# Patient Record
Sex: FEMALE | Race: WHITE
[De-identification: names, ages, dates, MRNs, and addresses within clinical notes are randomized per-mention and may not be internally consistent; named-entity substitution may affect disease eponyms.]

---

## 2021-04-04 ENCOUNTER — HOSPITAL ENCOUNTER (EMERGENCY)
Dept: HOSPITAL 41 - JD.ED | Age: 41
LOS: 1 days | Discharge: HOME | End: 2021-04-05
Payer: COMMERCIAL

## 2021-04-04 DIAGNOSIS — Z20.822: ICD-10-CM

## 2021-04-04 DIAGNOSIS — E05.90: ICD-10-CM

## 2021-04-04 DIAGNOSIS — R00.0: Primary | ICD-10-CM

## 2021-04-04 DIAGNOSIS — I44.0: ICD-10-CM

## 2021-04-04 DIAGNOSIS — R03.0: ICD-10-CM

## 2021-04-04 DIAGNOSIS — E87.6: ICD-10-CM

## 2021-04-04 PROCEDURE — U0002 COVID-19 LAB TEST NON-CDC: HCPCS

## 2021-04-05 NOTE — EDM.PDOC
ED HPI GENERAL MEDICAL PROBLEM





- General


Chief Complaint: Cardiovascular Problem


Stated Complaint: HEART PALP AND ARM NUMBNESS


Time Seen by Provider: 04/04/21 19:35


Source of Information: Reports: Patient


History Limitations: Reports: No Limitations





- History of Present Illness


INITIAL COMMENTS - FREE TEXT/NARRATIVE: 





The patient has come in with palpitations and complaint of mild tachycardia.  

She has taken her wrist pulse and found it to be around 100.  No chest pain or 

shortness of breath.  No other troubling symptoms.  No fever chest pain nilton 

shortness of breath cough urinary symptoms nausea vomiting.  There is a history 

of hyperthyroidism.  Apparently there is been some resolution of this without 

definitive treatment.  Patient is a non-smoker.  She is on a potassium 

supplement and an SSRI.  Lives in [place] and has an endocrinologist managing

her there.





- Related Data


                                    Allergies











Allergy/AdvReac Type Severity Reaction Status Date / Time


 


No Known Allergies Allergy   Verified 04/04/21 19:57











Home Meds: 


                                    Home Meds





Metoprolol Tartrate [Lopressor] 25 mg PO Q12HR #30 tab 04/05/21 [Rx]











Past Medical History


Endocrine/Metabolic History: Reports: Hyperthyroidism, Other (See Below)


Other Endocrine/Metabolic History: hypokalemia





Social & Family History





- Tobacco Use


Tobacco Use Status *Q: Never Tobacco User





ED ROS GENERAL





- Review of Systems


Review Of Systems: Comprehensive ROS is negative, except as noted in HPI.





ED EXAM, GENERAL





- Physical Exam


Exam: See Below


Free Text/Narrative:: 





On exam the patient is alert and in no distress.  Skin is warm and dry with 

normal turgor.  Head normocephalic atraumatic.  PERRLA EOMI.  ENT grossly 

normal.  Neck is supple without jugular venous distention.  Lungs are clear 

breath sounds are full and equal bilaterally.  Heart is a bit tachycardic rate 

around 100 no murmur.  Abdomen is soft and nontender no flank tenderness no 

guarding or rebound.  No peripheral edema cyanosis or clubbing of the digits.  

Neurologically she is intact with fluent speech symmetrical gait and no sensory 

or motor deficit.  She is poised with appropriate mood and affect.


  ** #1 Interpretation


EKG Date: 04/04/21


Time: 20:10


Rhythm: NSR


Rate (Beats/Min): 98


Axis: Normal


P-Wave: Present


QRS: Normal


ST-T: Normal


QT: Normal


MT/PQ Interval: 226ms


Comparison: NA - No Prior EKG


EKG Interpretation Comments: 





The patient has a mild tachycardia.  There is a first-degree AV block.  There is

 no acute ischemic change.





Course





- Vital Signs


Text/Narrative:: 





The patient is noted to be hypokalemic.  She knows that she is chronically 

hypokalemic and does take a potassium supplement.  She has been replenished both

 IV and orally here.  She is to continue taking her potassium supplement.  She 

is remained a little hypertensive and with a heart rate around 100.  We will go 

ahead and start her on a modest dose of beta-blocker 25 mg p.o. of Lopressor 

administered in the ER.  She will be continued on 25 mg twice a day.  She is 

going back to [place] her within 48 hours and is to notify her 

endocrinologist and other appropriate specialist to see her as soon as possible 

upon return.  Strict precautions for return to ER.


Last Recorded V/S: 





                                Last Vital Signs











Temp  37.0 C   04/04/21 19:54


 


Pulse  100   04/04/21 19:54


 


Resp  18   04/04/21 19:54


 


BP  169/100 H  04/04/21 19:54


 


Pulse Ox  98   04/04/21 19:54














- Orders/Labs/Meds


Orders: 





                               Active Orders 24 hr











 Category Date Time Status


 


 EKG Documentation Completion [RC] STAT Care  04/04/21 20:00 Active


 


 Peripheral IV Care [RC] .AS DIRECTED Care  04/04/21 20:02 Active


 


 Chest 1V Frontal [CR] Stat Exams  04/05/21 01:16 Taken


 


 CULTURE URINE [RM] Stat Lab  04/04/21 23:45 Received


 


 Metoprolol Tartrate [Lopressor] Med  04/05/21 02:30 Ordered





 25 mg PO ONETIME   


 


 Sodium Chloride 0.9% [Normal Saline] 1,000 ml Med  04/04/21 20:15 Active





 IV ASDIRECTED   


 


 Sodium Chloride 0.9% [Saline Flush] Med  04/04/21 20:00 Active





 10 ml FLUSH ASDIRECTED PRN   


 


 Peripheral IV Insertion Adult [OM.PC] Stat Oth  04/04/21 20:01 Ordered








                                Medication Orders





Sodium Chloride (Normal Saline)  1,000 mls @ 100 mls/hr IV ASDIRECTED MAREN


   Last Admin: 04/04/21 20:27  Dose: 100 mls/hr


   Documented by: DARIN


Sodium Chloride (Sodium Chloride 0.9% 10 Ml Syringe)  10 ml FLUSH ASDIRECTED PRN


   PRN Reason: Keep Vein Open


   Last Admin: 04/04/21 20:28  Dose: 10 ml


   Documented by: DARIN








Labs: 





                                Laboratory Tests











  04/04/21 04/04/21 04/04/21 Range/Units





  20:25 20:25 20:25 


 


WBC  5.24    (3.98-10.04)  K/mm3


 


RBC  4.94    (3.98-5.22)  M/mm3


 


Hgb  14.0    (11.2-15.7)  gm/dl


 


Hct  41.4    (34.1-44.9)  %


 


MCV  83.8    (79.4-94.8)  fl


 


MCH  28.3    (25.6-32.2)  pg


 


MCHC  33.8    (32.2-35.5)  g/dl


 


RDW Std Deviation  42.6    (36.4-46.3)  fL


 


Plt Count  311    (182-369)  K/mm3


 


MPV  9.9    (9.4-12.3)  fl


 


Neutrophils % (Manual)  61 H    (40-60)  %


 


Band Neutrophils %  0    (0-10)  %


 


Lymphocytes % (Manual)  27    (20-40)  %


 


Atypical Lymphs %  2    %


 


Monocytes % (Manual)  8    (2-10)  %


 


Eosinophils % (Manual)  1    (0.7-5.8)  %


 


Basophils % (Manual)  1    (0.1-1.2)  


 


Platelet Estimate  Adequate    


 


RBC Morph Comment  Normal    


 


PT    10.3  (9.7-12.0)  SECONDS


 


INR    0.96  


 


APTT    25.4  (21.7-31.4)  SECONDS


 


D-Dimer, Quantitative    < 0.19 L  (0.19-0.50)  mg/L


 


Sodium   145   (136-145)  mEq/L


 


Potassium   2.9 L   (3.5-5.1)  mEq/L


 


Chloride   104   ()  mEq/L


 


Carbon Dioxide   30   (21-32)  mEq/L


 


Anion Gap   13.9   (5-15)  


 


BUN   8   (7-18)  mg/dL


 


Creatinine   0.8   (0.55-1.02)  mg/dL


 


Est Cr Clr Drug Dosing   TNP   


 


Estimated GFR (MDRD)   > 60   (>60)  mL/min


 


BUN/Creatinine Ratio   10.0 L   (14-18)  


 


Glucose   108 H   ()  mg/dL


 


Calcium   9.6   (8.5-10.1)  mg/dL


 


Magnesium   2.2   (1.8-2.4)  mg/dl


 


Total Bilirubin   0.4   (0.2-1.0)  mg/dL


 


AST   21   (15-37)  U/L


 


ALT   31   (14-59)  U/L


 


Alkaline Phosphatase   76   ()  U/L


 


Creatine Kinase   139   ()  U/L


 


Troponin I   < 0.017   (0.00-0.056)  ng/mL


 


C-Reactive Protein   <0.2   (<1.0)  mg/dL


 


Total Protein   7.6   (6.4-8.2)  g/dl


 


Albumin   4.2   (3.4-5.0)  g/dl


 


Globulin   3.4   gm/dL


 


Albumin/Globulin Ratio   1.2   (1-2)  


 


TSH 3rd Generation   5.578 H   (0.358-3.74)  uIU/mL


 


Urine Color     (Yellow)  


 


Urine Appearance     (Clear)  


 


Urine pH     (5.0-8.0)  


 


Ur Specific Gravity     (1.005-1.030)  


 


Urine Protein     (Negative)  


 


Urine Glucose (UA)     (Negative)  


 


Urine Ketones     (Negative)  


 


Urine Occult Blood     (Negative)  


 


Urine Nitrite     (Negative)  


 


Urine Bilirubin     (Negative)  


 


Urine Urobilinogen     (0.2-1.0)  


 


Ur Leukocyte Esterase     (Negative)  


 


Urine RBC     (0-5)  /hpf


 


Urine WBC     (0-5)  /hpf


 


Ur Squamous Epith Cells     (0-5)  /hpf


 


Amorphous Sediment     (NOT SEEN)  /hpf


 


Urine Bacteria     (FEW)  /hpf


 


Urine Mucus     (FEW)  /hpf


 


Urine HCG, Qual     (NEGATIVE)  


 


Urine Opiates Screen     (SLFYSQ=042)  


 


Ur Buprenorphine Scrn     (CUTOFF=10)  


 


Ur Oxycodone Screen     (OHA2EP=029)  


 


Urine Methadone Screen     (SPUFIR=634)  


 


Ur Propoxyphene Screen     (HFXERO=695)  


 


Ur Barbiturates Screen     (FAJNMG=538)  


 


Ur Tricyclics Screen     (OODTNJ=211)  


 


Ur Phencyclidine Scrn     (CUTOFF=25)  


 


Ur Amphetamine Screen     (VDFQDD=959)  


 


U Methamphetamines Scrn     (YIQASN=770)  


 


U Benzodiazepines Scrn     (SBGLSJ=047)  


 


U Cocaine Metab Screen     (RLDMAE=650)  


 


U Marijuana (THC) Screen     (CUTOFF=50)  


 


Ethyl Alcohol   0.00   (0.00)  gm%


 


SARS-CoV-2 RNA (FRANCI)     (NEGATIVE)  














  04/04/21 04/04/21 04/04/21 Range/Units





  23:45 23:45 23:45 


 


WBC     (3.98-10.04)  K/mm3


 


RBC     (3.98-5.22)  M/mm3


 


Hgb     (11.2-15.7)  gm/dl


 


Hct     (34.1-44.9)  %


 


MCV     (79.4-94.8)  fl


 


MCH     (25.6-32.2)  pg


 


MCHC     (32.2-35.5)  g/dl


 


RDW Std Deviation     (36.4-46.3)  fL


 


Plt Count     (182-369)  K/mm3


 


MPV     (9.4-12.3)  fl


 


Neutrophils % (Manual)     (40-60)  %


 


Band Neutrophils %     (0-10)  %


 


Lymphocytes % (Manual)     (20-40)  %


 


Atypical Lymphs %     %


 


Monocytes % (Manual)     (2-10)  %


 


Eosinophils % (Manual)     (0.7-5.8)  %


 


Basophils % (Manual)     (0.1-1.2)  


 


Platelet Estimate     


 


RBC Morph Comment     


 


PT     (9.7-12.0)  SECONDS


 


INR     


 


APTT     (21.7-31.4)  SECONDS


 


D-Dimer, Quantitative     (0.19-0.50)  mg/L


 


Sodium     (136-145)  mEq/L


 


Potassium     (3.5-5.1)  mEq/L


 


Chloride     ()  mEq/L


 


Carbon Dioxide     (21-32)  mEq/L


 


Anion Gap     (5-15)  


 


BUN     (7-18)  mg/dL


 


Creatinine     (0.55-1.02)  mg/dL


 


Est Cr Clr Drug Dosing     


 


Estimated GFR (MDRD)     (>60)  mL/min


 


BUN/Creatinine Ratio     (14-18)  


 


Glucose     ()  mg/dL


 


Calcium     (8.5-10.1)  mg/dL


 


Magnesium     (1.8-2.4)  mg/dl


 


Total Bilirubin     (0.2-1.0)  mg/dL


 


AST     (15-37)  U/L


 


ALT     (14-59)  U/L


 


Alkaline Phosphatase     ()  U/L


 


Creatine Kinase     ()  U/L


 


Troponin I     (0.00-0.056)  ng/mL


 


C-Reactive Protein     (<1.0)  mg/dL


 


Total Protein     (6.4-8.2)  g/dl


 


Albumin     (3.4-5.0)  g/dl


 


Globulin     gm/dL


 


Albumin/Globulin Ratio     (1-2)  


 


TSH 3rd Generation     (0.358-3.74)  uIU/mL


 


Urine Color   Light yellow   (Yellow)  


 


Urine Appearance   Clear   (Clear)  


 


Urine pH   7.5   (5.0-8.0)  


 


Ur Specific Gravity   1.020   (1.005-1.030)  


 


Urine Protein   Negative   (Negative)  


 


Urine Glucose (UA)   Negative   (Negative)  


 


Urine Ketones   1+ H   (Negative)  


 


Urine Occult Blood   Trace-intact H   (Negative)  


 


Urine Nitrite   Negative   (Negative)  


 


Urine Bilirubin   Negative   (Negative)  


 


Urine Urobilinogen   0.2   (0.2-1.0)  


 


Ur Leukocyte Esterase   Trace H   (Negative)  


 


Urine RBC   0-5   (0-5)  /hpf


 


Urine WBC   0-5   (0-5)  /hpf


 


Ur Squamous Epith Cells   0-5   (0-5)  /hpf


 


Amorphous Sediment   Rare H   (NOT SEEN)  /hpf


 


Urine Bacteria   Rare   (FEW)  /hpf


 


Urine Mucus   Rare   (FEW)  /hpf


 


Urine HCG, Qual  Negative    (NEGATIVE)  


 


Urine Opiates Screen    Negative  (LPUFQF=619)  


 


Ur Buprenorphine Scrn    Negative  (CUTOFF=10)  


 


Ur Oxycodone Screen    Negative  (ZKU3XC=673)  


 


Urine Methadone Screen    Negative  (FGRSLZ=214)  


 


Ur Propoxyphene Screen    Negative  (JLYBEE=543)  


 


Ur Barbiturates Screen    Negative  (AOKFVS=020)  


 


Ur Tricyclics Screen    Negative  (QCPHBC=098)  


 


Ur Phencyclidine Scrn    Negative  (CUTOFF=25)  


 


Ur Amphetamine Screen    Negative  (IIPHNN=051)  


 


U Methamphetamines Scrn    Negative  (FBXVVV=218)  


 


U Benzodiazepines Scrn    Negative  (DWCZIB=527)  


 


U Cocaine Metab Screen    Negative  (KSBSNY=965)  


 


U Marijuana (THC) Screen    Negative  (CUTOFF=50)  


 


Ethyl Alcohol     (0.00)  gm%


 


SARS-CoV-2 RNA (FRANCI)     (NEGATIVE)  














  04/04/21 Range/Units





  23:48 


 


WBC   (3.98-10.04)  K/mm3


 


RBC   (3.98-5.22)  M/mm3


 


Hgb   (11.2-15.7)  gm/dl


 


Hct   (34.1-44.9)  %


 


MCV   (79.4-94.8)  fl


 


MCH   (25.6-32.2)  pg


 


MCHC   (32.2-35.5)  g/dl


 


RDW Std Deviation   (36.4-46.3)  fL


 


Plt Count   (182-369)  K/mm3


 


MPV   (9.4-12.3)  fl


 


Neutrophils % (Manual)   (40-60)  %


 


Band Neutrophils %   (0-10)  %


 


Lymphocytes % (Manual)   (20-40)  %


 


Atypical Lymphs %   %


 


Monocytes % (Manual)   (2-10)  %


 


Eosinophils % (Manual)   (0.7-5.8)  %


 


Basophils % (Manual)   (0.1-1.2)  


 


Platelet Estimate   


 


RBC Morph Comment   


 


PT   (9.7-12.0)  SECONDS


 


INR   


 


APTT   (21.7-31.4)  SECONDS


 


D-Dimer, Quantitative   (0.19-0.50)  mg/L


 


Sodium   (136-145)  mEq/L


 


Potassium   (3.5-5.1)  mEq/L


 


Chloride   ()  mEq/L


 


Carbon Dioxide   (21-32)  mEq/L


 


Anion Gap   (5-15)  


 


BUN   (7-18)  mg/dL


 


Creatinine   (0.55-1.02)  mg/dL


 


Est Cr Clr Drug Dosing   


 


Estimated GFR (MDRD)   (>60)  mL/min


 


BUN/Creatinine Ratio   (14-18)  


 


Glucose   ()  mg/dL


 


Calcium   (8.5-10.1)  mg/dL


 


Magnesium   (1.8-2.4)  mg/dl


 


Total Bilirubin   (0.2-1.0)  mg/dL


 


AST   (15-37)  U/L


 


ALT   (14-59)  U/L


 


Alkaline Phosphatase   ()  U/L


 


Creatine Kinase   ()  U/L


 


Troponin I   (0.00-0.056)  ng/mL


 


C-Reactive Protein   (<1.0)  mg/dL


 


Total Protein   (6.4-8.2)  g/dl


 


Albumin   (3.4-5.0)  g/dl


 


Globulin   gm/dL


 


Albumin/Globulin Ratio   (1-2)  


 


TSH 3rd Generation   (0.358-3.74)  uIU/mL


 


Urine Color   (Yellow)  


 


Urine Appearance   (Clear)  


 


Urine pH   (5.0-8.0)  


 


Ur Specific Gravity   (1.005-1.030)  


 


Urine Protein   (Negative)  


 


Urine Glucose (UA)   (Negative)  


 


Urine Ketones   (Negative)  


 


Urine Occult Blood   (Negative)  


 


Urine Nitrite   (Negative)  


 


Urine Bilirubin   (Negative)  


 


Urine Urobilinogen   (0.2-1.0)  


 


Ur Leukocyte Esterase   (Negative)  


 


Urine RBC   (0-5)  /hpf


 


Urine WBC   (0-5)  /hpf


 


Ur Squamous Epith Cells   (0-5)  /hpf


 


Amorphous Sediment   (NOT SEEN)  /hpf


 


Urine Bacteria   (FEW)  /hpf


 


Urine Mucus   (FEW)  /hpf


 


Urine HCG, Qual   (NEGATIVE)  


 


Urine Opiates Screen   (DIHNNZ=864)  


 


Ur Buprenorphine Scrn   (CUTOFF=10)  


 


Ur Oxycodone Screen   (UIB2OS=890)  


 


Urine Methadone Screen   (ZMDELH=344)  


 


Ur Propoxyphene Screen   (LQLTMO=512)  


 


Ur Barbiturates Screen   (DZYGIC=366)  


 


Ur Tricyclics Screen   (BRTCTC=832)  


 


Ur Phencyclidine Scrn   (CUTOFF=25)  


 


Ur Amphetamine Screen   (DTEYXM=427)  


 


U Methamphetamines Scrn   (UBUTIQ=481)  


 


U Benzodiazepines Scrn   (WNRRON=795)  


 


U Cocaine Metab Screen   (YBKGUU=584)  


 


U Marijuana (THC) Screen   (CUTOFF=50)  


 


Ethyl Alcohol   (0.00)  gm%


 


SARS-CoV-2 RNA (FRANCI)  Negative  (NEGATIVE)  











Meds: 





Medications











Generic Name Dose Route Start Last Admin





  Trade Name Freq  PRN Reason Stop Dose Admin


 


Sodium Chloride  1,000 mls @ 100 mls/hr  04/04/21 20:15  04/04/21 20:27





  Normal Saline  IV   100 mls/hr





  ASDIRECTED MAREN   Administration


 


Sodium Chloride  10 ml  04/04/21 20:00  04/04/21 20:28





  Sodium Chloride 0.9% 10 Ml Syringe  FLUSH   10 ml





  ASDIRECTED PRN   Administration





  Keep Vein Open  














Discontinued Medications














Generic Name Dose Route Start Last Admin





  Trade Name Freq  PRN Reason Stop Dose Admin


 


Potassium Chloride 10 meq/  100 mls @ 100 mls/hr  04/04/21 23:45  04/05/21 00:58





  Premix  IV  04/05/21 01:44  100 mls/hr





  Q1H MAREN   Administration


 


Potassium Chloride  40 meq  04/04/21 23:34  04/04/21 23:58





  Potassium Chloride 20 Meq Tab.Er  PO  04/04/21 23:35  40 meq





  ONETIME ONE   Administration














Departure





- Departure


Time of Disposition: 02:28


Disposition: Home, Self-Care 01


Condition: Good


Clinical Impression: 


 Sinus tachycardia, Hypokalemia, History of hyperthyroidism, Blood pressure 

elevated without history of HTN





Prescriptions: 


Metoprolol Tartrate [Lopressor] 25 mg PO Q12HR #30 tab


Referrals: 


PCP,Not In Area [Primary Care Provider] - 


Additional Instructions: 


You have been seen for a mild tachycardia.  There are no troubling lab findings 

other than a low potassium of 2.9.  This is been replenished both IV and the 

oral route.  You have a prescription for Lopressor 25 mg twice a day.  This is 

for elevation in blood pressure and to moderate your heart rate.  You received 

an oral dose in the ER.  Notify your endocrinologist another appropriate 

specialist for your return to [place] and arrange to be seen as soon as 

possible after arrival.  Any problems in the meantime do not hesitate to return 

to the ER immediately.





Sepsis Event Note (ED)





- Evaluation


Sepsis Screening Result: No Definite Risk





- Focused Exam


Vital Signs: 





                                   Vital Signs











  Temp Pulse Resp BP Pulse Ox


 


 04/04/21 19:54  37.0 C  100  18  169/100 H  98














- My Orders


Last 24 Hours: 





My Active Orders





04/04/21 20:00


EKG Documentation Completion [RC] STAT 


Sodium Chloride 0.9% [Saline Flush]   10 ml FLUSH ASDIRECTED PRN 





04/04/21 20:01


Peripheral IV Insertion Adult [OM.PC] Stat 





04/04/21 20:02


Peripheral IV Care [RC] .AS DIRECTED 





04/04/21 20:15


Sodium Chloride 0.9% [Normal Saline] 1,000 ml IV ASDIRECTED 





04/04/21 23:45


CULTURE URINE [RM] Stat 





04/05/21 01:16


Chest 1V Frontal [CR] Stat 





04/05/21 02:30


Metoprolol Tartrate [Lopressor]   25 mg PO ONETIME 














- Assessment/Plan


Last 24 Hours: 





My Active Orders





04/04/21 20:00


EKG Documentation Completion [RC] STAT 


Sodium Chloride 0.9% [Saline Flush]   10 ml FLUSH ASDIRECTED PRN 





04/04/21 20:01


Peripheral IV Insertion Adult [OM.PC] Stat 





04/04/21 20:02


Peripheral IV Care [RC] .AS DIRECTED 





04/04/21 20:15


Sodium Chloride 0.9% [Normal Saline] 1,000 ml IV ASDIRECTED 





04/04/21 23:45


CULTURE URINE [RM] Stat 





04/05/21 01:16


Chest 1V Frontal [CR] Stat 





04/05/21 02:30


Metoprolol Tartrate [Lopressor]   25 mg PO ONETIME

## 2021-04-05 NOTE — CR
Chest: Portable view of the chest was obtained.

 

Comparison: No prior chest imaging is available.

 

Heart size and mediastinum are normal.  Lungs are clear with no acute 

parenchymal change.  Scoliosis is seen within the spine.

 

Impression:

1.  Scoliosis.

2.  Nothing acute is seen on portable chest x-ray.

 

Diagnostic code #1